# Patient Record
Sex: MALE | Employment: FULL TIME | URBAN - METROPOLITAN AREA
[De-identification: names, ages, dates, MRNs, and addresses within clinical notes are randomized per-mention and may not be internally consistent; named-entity substitution may affect disease eponyms.]

---

## 2022-10-02 ENCOUNTER — HOSPITAL ENCOUNTER (EMERGENCY)
Age: 37
Discharge: HOME | End: 2022-10-02
Attending: EMERGENCY MEDICINE
Payer: COMMERCIAL

## 2022-10-02 ENCOUNTER — APPOINTMENT (OUTPATIENT)
Dept: GENERAL RADIOLOGY | Age: 37
End: 2022-10-02
Payer: COMMERCIAL

## 2022-10-02 VITALS
TEMPERATURE: 99 F | RESPIRATION RATE: 19 BRPM | SYSTOLIC BLOOD PRESSURE: 142 MMHG | HEIGHT: 71 IN | OXYGEN SATURATION: 98 % | HEART RATE: 89 BPM | WEIGHT: 245 LBS | BODY MASS INDEX: 34.3 KG/M2 | DIASTOLIC BLOOD PRESSURE: 87 MMHG

## 2022-10-02 DIAGNOSIS — J20.9 ACUTE BRONCHITIS, UNSPECIFIED ORGANISM: Primary | ICD-10-CM

## 2022-10-02 DIAGNOSIS — J01.90 ACUTE SINUSITIS, RECURRENCE NOT SPECIFIED, UNSPECIFIED LOCATION: ICD-10-CM

## 2022-10-02 PROCEDURE — 71046 X-RAY EXAM CHEST 2 VIEWS: CPT

## 2022-10-02 PROCEDURE — 99283 EMERGENCY DEPT VISIT LOW MDM: CPT

## 2022-10-02 RX ORDER — FLUTICASONE PROPIONATE 50 MCG
2 SPRAY, SUSPENSION (ML) NASAL DAILY
Qty: 16 G | Refills: 0 | Status: SHIPPED | OUTPATIENT
Start: 2022-10-02

## 2022-10-02 RX ORDER — AMOXICILLIN AND CLAVULANATE POTASSIUM 875; 125 MG/1; MG/1
1 TABLET, FILM COATED ORAL 2 TIMES DAILY
Qty: 20 TABLET | Refills: 0 | Status: SHIPPED | OUTPATIENT
Start: 2022-10-02 | End: 2022-10-12

## 2022-10-02 RX ORDER — BENZONATATE 100 MG/1
100 CAPSULE ORAL 3 TIMES DAILY PRN
Qty: 30 CAPSULE | Refills: 0 | Status: SHIPPED | OUTPATIENT
Start: 2022-10-02 | End: 2022-10-09

## 2022-10-02 ASSESSMENT — ENCOUNTER SYMPTOMS
GASTROINTESTINAL NEGATIVE: 1
COUGH: 1

## 2022-10-02 ASSESSMENT — PAIN - FUNCTIONAL ASSESSMENT: PAIN_FUNCTIONAL_ASSESSMENT: NONE - DENIES PAIN

## 2022-10-02 NOTE — DISCHARGE INSTRUCTIONS
The lot number below to establish care with primary care doctor. Take medications as prescribed. Tylenol or ibuprofen as needed for body aches. Return if worsening. We would love to help you get a primary care doctor for follow-up after your emergency department visit. Please call [phone] between 7AM - 6PM Monday to Friday. A care navigator will be able to assist you with setting up a doctor close to your home.

## 2022-10-02 NOTE — ED NOTES
Pt arrives via POV coming from home c/o bilateral ear pain, congestion cough that started 1 wk ago. Pt states he took an at home covid test and it was negative. Pt states his smpyoms have progressively worsened. No other complaints at time of triage.       Lucinda Kim RN  10/02/22 [id number]

## 2022-10-02 NOTE — ED PROVIDER NOTES
Emergency Department Provider Note                   PCP:                Kandra Nissen, MD               Age: 39 y.o. Sex: male       ICD-10-CM    1. Acute bronchitis, unspecified organism  J20.9       2. Acute sinusitis, recurrence not specified, unspecified location  J01.90           DISPOSITION Decision To Discharge 10/02/2022 05:44:27 PM        MDM  Number of Diagnoses or Management Options  Acute bronchitis, unspecified organism  Acute sinusitis, recurrence not specified, unspecified location  Diagnosis management comments: Patient presents with sinus congestion, cough, ear pressure for 10 days. Worsening with the use of over-the-counter meds. I think at this point it is reasonable to treat him for sinusitis with Augmentin and Flonase. We will give cough medication. X-ray does not show evidence of pneumonia. He is to follow-up with his doctor and return if worsening in any way.     Risk of Complications, Morbidity, and/or Mortality  Presenting problems: low  Diagnostic procedures: low  Management options: low    Patient Progress  Patient progress: stable             Orders Placed This Encounter   Procedures    XR CHEST (2 VW)        Medications - No data to display    Discharge Medication List as of 10/2/2022  6:04 PM        START taking these medications    Details   fluticasone (FLONASE) 50 MCG/ACT nasal spray 2 sprays by Each Nostril route daily, Disp-16 g, R-0Print      amoxicillin-clavulanate (AUGMENTIN) 875-125 MG per tablet Take 1 tablet by mouth 2 times daily for 10 days, Disp-20 tablet, R-0Print      benzonatate (TESSALON) 100 MG capsule Take 1 capsule by mouth 3 times daily as needed for Cough, Disp-30 capsule, R-0Print              Celine Bey is a 39 y.o. male who presents to the Emergency Department with chief complaint of    Chief Complaint   Patient presents with    Cough    Nasal Congestion    Ear Fullness      Patient is 68-year-old male who presents with productive cough and sinus congestion for 10 days. Has had body aches. COVID test has been negative. He has multiple chronic medical problems including diabetes. He denies any known sick contacts. Is been taking over-the-counter medications without improvement. Review of Systems   Constitutional: Negative. HENT:  Positive for congestion and ear pain. Respiratory:  Positive for cough. Cardiovascular: Negative. Gastrointestinal: Negative. Genitourinary: Negative. All other systems reviewed and are negative. No past medical history on file. No past surgical history on file. No family history on file. Social History     Socioeconomic History    Marital status:          Patient has no known allergies. Discharge Medication List as of 10/2/2022  6:04 PM           Vitals signs and nursing note reviewed. Patient Vitals for the past 4 hrs:   Temp Pulse Resp BP SpO2   10/02/22 1813 99 °F (37.2 °C) 89 19 (!) 142/87 98 %   10/02/22 1716 99.3 °F (37.4 °C) (!) 109 16 (!) 153/93 99 %          Physical Exam  Vitals and nursing note reviewed. Constitutional:       General: He is not in acute distress. Appearance: Normal appearance. He is obese. He is not ill-appearing or toxic-appearing. HENT:      Head: Normocephalic. Right Ear: Tympanic membrane normal.      Left Ear: Tympanic membrane normal.      Nose: Congestion and rhinorrhea present. Mouth/Throat:      Mouth: Mucous membranes are moist.   Eyes:      Extraocular Movements: Extraocular movements intact. Pupils: Pupils are equal, round, and reactive to light. Cardiovascular:      Rate and Rhythm: Normal rate and regular rhythm. Pulses: Normal pulses. Heart sounds: Normal heart sounds. Pulmonary:      Effort: Pulmonary effort is normal.      Breath sounds: Normal breath sounds. Musculoskeletal:         General: No swelling or tenderness. Normal range of motion.       Cervical back: Normal range of motion and neck supple. Skin:     General: Skin is warm and dry. Findings: No rash. Neurological:      General: No focal deficit present. Mental Status: He is alert and oriented to person, place, and time. Mental status is at baseline. Psychiatric:         Mood and Affect: Mood normal.         Behavior: Behavior normal.         Thought Content: Thought content normal.        Procedures    Results for orders placed or performed during the hospital encounter of 10/02/22   XR CHEST (2 VW)    Narrative    Chest 2 view    CLINICAL INDICATION: One week of moderate coughing, congestion, mild shortness  of breath, bilateral ear pain    COMPARISON: None    TECHNIQUE: Upright PA  and lateral views of the chest     FINDINGS: Lung volumes are well inflated. Cardiomediastinal silhouette and  hilar contours within normal limits. The lungs are clear. No acute osseous abnormalities are seen. Impression    No acute disease. XR CHEST (2 VW)   Final Result   No acute disease. Voice dictation software was used during the making of this note. This software is not perfect and grammatical and other typographical errors may be present. This note has not been completely proofread for errors.         Carrie Kennyma  10/02/22 [id number]

## 2022-10-02 NOTE — Clinical Note
Lexus Mayer was seen and treated in our emergency department on 10/2/2022. He may return to work on 10/05/2022. If you have any questions or concerns, please don't hesitate to call.       MEGHAN Jones

## 2022-10-02 NOTE — ED NOTES
I have reviewed discharge instructions with the patient. The patient verbalized understanding. Patient left ED via Discharge Method: ambulatory to Home with family    Opportunity for questions and clarification provided. Patient given 3 scripts. To continue your aftercare when you leave the hospital, you may receive an automated call from our care team to check in on how you are doing. This is a free service and part of our promise to provide the best care and service to meet your aftercare needs.  If you have questions, or wish to unsubscribe from this service please call [phone]. Thank you for Choosing our [facility] Emergency Department.       Michelle Judge RN  10/02/22 2029